# Patient Record
Sex: MALE | Race: BLACK OR AFRICAN AMERICAN | NOT HISPANIC OR LATINO
[De-identification: names, ages, dates, MRNs, and addresses within clinical notes are randomized per-mention and may not be internally consistent; named-entity substitution may affect disease eponyms.]

---

## 2024-03-11 PROBLEM — Z00.00 ENCOUNTER FOR PREVENTIVE HEALTH EXAMINATION: Status: ACTIVE | Noted: 2024-03-11

## 2024-03-13 ENCOUNTER — APPOINTMENT (OUTPATIENT)
Dept: CARDIOTHORACIC SURGERY | Facility: CLINIC | Age: 79
End: 2024-03-13
Payer: MEDICARE

## 2024-03-13 DIAGNOSIS — Z72.0 TOBACCO USE: ICD-10-CM

## 2024-03-13 DIAGNOSIS — R07.89 OTHER CHEST PAIN: ICD-10-CM

## 2024-03-13 DIAGNOSIS — Z86.39 PERSONAL HISTORY OF OTHER ENDOCRINE, NUTRITIONAL AND METABOLIC DISEASE: ICD-10-CM

## 2024-03-13 DIAGNOSIS — E78.5 HYPERLIPIDEMIA, UNSPECIFIED: ICD-10-CM

## 2024-03-13 DIAGNOSIS — Z82.49 FAMILY HISTORY OF ISCHEMIC HEART DISEASE AND OTHER DISEASES OF THE CIRCULATORY SYSTEM: ICD-10-CM

## 2024-03-13 DIAGNOSIS — E03.9 HYPOTHYROIDISM, UNSPECIFIED: ICD-10-CM

## 2024-03-13 DIAGNOSIS — Q24.5 MALFORMATION OF CORONARY VESSELS: ICD-10-CM

## 2024-03-13 DIAGNOSIS — I10 ESSENTIAL (PRIMARY) HYPERTENSION: ICD-10-CM

## 2024-03-13 DIAGNOSIS — M19.91 PRIMARY OSTEOARTHRITIS, UNSPECIFIED SITE: ICD-10-CM

## 2024-03-13 PROCEDURE — 99202 OFFICE O/P NEW SF 15 MIN: CPT

## 2024-03-13 RX ORDER — HYDROCHLOROTHIAZIDE 12.5 MG/1
12.5 CAPSULE ORAL
Refills: 0 | Status: ACTIVE | COMMUNITY

## 2024-03-13 RX ORDER — AMLODIPINE BESYLATE 5 MG/1
5 TABLET ORAL
Refills: 0 | Status: ACTIVE | COMMUNITY

## 2024-03-13 RX ORDER — SEMAGLUTIDE 0.68 MG/ML
INJECTION, SOLUTION SUBCUTANEOUS
Refills: 0 | Status: DISCONTINUED | COMMUNITY
End: 2024-03-13

## 2024-03-13 RX ORDER — KRILL/OM-3/DHA/EPA/PHOSPHO/AST 1000-230MG
CAPSULE ORAL
Refills: 0 | Status: ACTIVE | COMMUNITY

## 2024-03-13 RX ORDER — METFORMIN HYDROCHLORIDE 500 MG/1
500 TABLET, COATED ORAL
Refills: 0 | Status: ACTIVE | COMMUNITY

## 2024-03-13 RX ORDER — LEVOTHYROXINE SODIUM 100 UG/1
100 TABLET ORAL
Refills: 0 | Status: ACTIVE | COMMUNITY

## 2024-03-13 RX ORDER — PIOGLITAZONE 30 MG/1
30 TABLET ORAL
Refills: 0 | Status: ACTIVE | COMMUNITY

## 2024-03-13 RX ORDER — ATORVASTATIN CALCIUM 10 MG/1
10 TABLET, FILM COATED ORAL
Refills: 0 | Status: ACTIVE | COMMUNITY

## 2024-03-13 RX ORDER — GLIPIZIDE 5 MG/1
5 TABLET ORAL
Refills: 0 | Status: ACTIVE | COMMUNITY

## 2024-03-13 RX ORDER — OMEPRAZOLE 40 MG/1
40 CAPSULE, DELAYED RELEASE ORAL
Refills: 0 | Status: ACTIVE | COMMUNITY

## 2024-03-13 RX ORDER — RAMIPRIL 10 MG/1
10 CAPSULE ORAL
Refills: 0 | Status: ACTIVE | COMMUNITY

## 2024-03-14 NOTE — HISTORY OF PRESENT ILLNESS
[Home] : at home, [unfilled] , at the time of the visit. 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 Patient is under the care of Dr. Lisette Stewart.   Pt initially reported occasional chest pain however currently denies. He denies fever, chills, fatigue, headache, blurred vision, dizziness, syncope, chest pain, palpitations, shortness of breath, orthopnea, paroxysmal nocturnal dyspnea, nausea, vomiting, abdominal pain, back pain, BRBPR or swelling to legs.  Pt underwent TTE 1/30/24 revealing EF 49%, mild asymmetric LVH without elevated LVOT gradient, mild MR/TR. His pharmacological stress test showed partially reversible moderate inferior wall and apical defect, suggestive of ischemia, infarction and or reinaldo infarction ischemia and LVEF was 49% with inferior wall hypokinesis.  CTA coronaries 2/22/24:  calcium score 59 Agaston units, 81st percentile. 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 the right coronary is compressed, between the aortic root, and the right ventricular outflow tract.  calcific plaque in the proximal right coronary which appears peripheral and nonstenotic. there is some limitation of visualization of the proximal right coronary anatomy.  Cardiac cath 2/6/24 @ [facility] with Dr. Joseph 1. Left main: angiographically normal 2. LAD: minimal luminal irregularities 3. LCx: angiographically normal 4. 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 Difficult to selectively engage but non-selective angio did not show evidence of severe lesion.

## 2024-03-14 NOTE — ASSESSMENT
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   I have reviewed the patient's medical records, diagnostic images during the time of this office consultation and have made the following recommendation. Cardiac catheterization, CCTA and TTE were completed. The report was reviewed and noted in the chart, I independently reviewed and interpreted images and report and I reviewed the films/CD and agree.  Review of the imaging shows his coronary pathology has remained stable and does not require surgical intervention.  - Continue medication regimen. - Follow up with cardiologist Dr. Stewart and PCP. - Blood pressure management. - Discussed signs and symptoms that warrant emergency medical attention.

## 2024-03-14 NOTE — CONSULT LETTER
[Dear  ___] : Dear  [unfilled], [Please see my note below.] : Please see my note below. [Sincerely,] : Sincerely, [FreeTextEntry2] : Dr. Lisette Stewart  [FreeTextEntry1] : Please find attached our consultation on your patient, Mr. LORRI BIRMINGHAM .   We take a multidisciplinary team approach to patient care and consider you, the referring physician, an extension of our team. We will maintain an open line of communication with you throughout your patient's treatment course.    It is our commitment to provide your patient with the highest quality of advanced therapeutic options. We thank you for allowing us to participate in the care of your patient.   Please do not hesitate to contact our team with any questions or concerns at [phone]. [FreeTextEntry3] : SARAVANAN Mark MD, FACS  [facility] Department of Cardiovascular and Thoracic Surgery Professor of Surgery [facility] at [facility]

## 2024-03-14 NOTE — END OF VISIT
[FreeTextEntry3] : I, SARAVANAN Dolan , personally performed the evaluation and management (E/M) services for this new patient.  That E/M includes conducting the clinically appropriate initial history &/or exam, assessing all conditions, and establishing the plan of care.  Today, my BRYANT, was here to observe &/or participate in the visit & follow plan of care established by me.